# Patient Record
Sex: FEMALE | Race: BLACK OR AFRICAN AMERICAN
[De-identification: names, ages, dates, MRNs, and addresses within clinical notes are randomized per-mention and may not be internally consistent; named-entity substitution may affect disease eponyms.]

---

## 2019-08-24 ENCOUNTER — HOSPITAL ENCOUNTER (EMERGENCY)
Dept: HOSPITAL 62 - ER | Age: 39
Discharge: HOME | End: 2019-08-24
Payer: MEDICARE

## 2019-08-24 VITALS — DIASTOLIC BLOOD PRESSURE: 94 MMHG | SYSTOLIC BLOOD PRESSURE: 150 MMHG

## 2019-08-24 DIAGNOSIS — R07.9: Primary | ICD-10-CM

## 2019-08-24 DIAGNOSIS — R06.02: ICD-10-CM

## 2019-08-24 LAB
ADD MANUAL DIFF: NO
ALBUMIN SERPL-MCNC: 3.7 G/DL (ref 3.5–5)
ALP SERPL-CCNC: 111 U/L (ref 38–126)
ANION GAP SERPL CALC-SCNC: 5 MMOL/L (ref 5–19)
APPEARANCE UR: CLEAR
APTT PPP: YELLOW S
AST SERPL-CCNC: 39 U/L (ref 14–36)
BASOPHILS # BLD AUTO: 0 10^3/UL (ref 0–0.2)
BASOPHILS NFR BLD AUTO: 0.9 % (ref 0–2)
BILIRUB DIRECT SERPL-MCNC: 0.4 MG/DL (ref 0–0.4)
BILIRUB SERPL-MCNC: 0.5 MG/DL (ref 0.2–1.3)
BILIRUB UR QL STRIP: NEGATIVE
BUN SERPL-MCNC: 6 MG/DL (ref 7–20)
CALCIUM: 9.3 MG/DL (ref 8.4–10.2)
CHLORIDE SERPL-SCNC: 110 MMOL/L (ref 98–107)
CO2 SERPL-SCNC: 25 MMOL/L (ref 22–30)
EOSINOPHIL # BLD AUTO: 0 10^3/UL (ref 0–0.6)
EOSINOPHIL NFR BLD AUTO: 0.3 % (ref 0–6)
ERYTHROCYTE [DISTWIDTH] IN BLOOD BY AUTOMATED COUNT: 17.8 % (ref 11.5–14)
GLUCOSE SERPL-MCNC: 81 MG/DL (ref 75–110)
GLUCOSE UR STRIP-MCNC: NEGATIVE MG/DL
HCT VFR BLD CALC: 33 % (ref 36–47)
HGB BLD-MCNC: 10.5 G/DL (ref 12–15.5)
KETONES UR STRIP-MCNC: NEGATIVE MG/DL
LYMPHOCYTES # BLD AUTO: 1.4 10^3/UL (ref 0.5–4.7)
LYMPHOCYTES NFR BLD AUTO: 30.6 % (ref 13–45)
MCH RBC QN AUTO: 23.5 PG (ref 27–33.4)
MCHC RBC AUTO-ENTMCNC: 31.8 G/DL (ref 32–36)
MCV RBC AUTO: 74 FL (ref 80–97)
MONOCYTES # BLD AUTO: 0.4 10^3/UL (ref 0.1–1.4)
MONOCYTES NFR BLD AUTO: 9.4 % (ref 3–13)
NEUTROPHILS # BLD AUTO: 2.7 10^3/UL (ref 1.7–8.2)
NEUTS SEG NFR BLD AUTO: 58.8 % (ref 42–78)
NITRITE UR QL STRIP: NEGATIVE
PH UR STRIP: 6 [PH] (ref 5–9)
PLATELET # BLD: 363 10^3/UL (ref 150–450)
POTASSIUM SERPL-SCNC: 4.5 MMOL/L (ref 3.6–5)
PROT SERPL-MCNC: 8.1 G/DL (ref 6.3–8.2)
PROT UR STRIP-MCNC: 100 MG/DL
RBC # BLD AUTO: 4.47 10^6/UL (ref 3.72–5.28)
SP GR UR STRIP: 1.01
TOTAL CELLS COUNTED % (AUTO): 100 %
UROBILINOGEN UR-MCNC: NEGATIVE MG/DL (ref ?–2)
WBC # BLD AUTO: 4.6 10^3/UL (ref 4–10.5)

## 2019-08-24 PROCEDURE — 93005 ELECTROCARDIOGRAM TRACING: CPT

## 2019-08-24 PROCEDURE — 99285 EMERGENCY DEPT VISIT HI MDM: CPT

## 2019-08-24 PROCEDURE — 84484 ASSAY OF TROPONIN QUANT: CPT

## 2019-08-24 PROCEDURE — 36415 COLL VENOUS BLD VENIPUNCTURE: CPT

## 2019-08-24 PROCEDURE — 81001 URINALYSIS AUTO W/SCOPE: CPT

## 2019-08-24 PROCEDURE — 71046 X-RAY EXAM CHEST 2 VIEWS: CPT

## 2019-08-24 PROCEDURE — 93010 ELECTROCARDIOGRAM REPORT: CPT

## 2019-08-24 PROCEDURE — 85025 COMPLETE CBC W/AUTO DIFF WBC: CPT

## 2019-08-24 PROCEDURE — 71275 CT ANGIOGRAPHY CHEST: CPT

## 2019-08-24 PROCEDURE — 85379 FIBRIN DEGRADATION QUANT: CPT

## 2019-08-24 PROCEDURE — 80053 COMPREHEN METABOLIC PANEL: CPT

## 2019-08-24 PROCEDURE — 84703 CHORIONIC GONADOTROPIN ASSAY: CPT

## 2019-08-24 NOTE — ER DOCUMENT REPORT
ED General





- General


Chief Complaint: Chest Pain


Stated Complaint: CHEST PAIN


Time Seen by Provider: 08/24/19 15:22


Primary Care Provider: 


OMAR STARR MD [ACTIVE STAFF] - Follow up in 3-5 days (local primary care. )


Mode of Arrival: Ambulatory


Notes: 





Patient is a 39-year-old female that presents to the emergency department for 

chief complaint of chest pain.  The patient reports that the pain started 2 days

ago.  The currently rate the pain as 6 out of 10, and described as constant heav

iness in the middle of her chest.  They have had associated shortness of breath.

 Denies any nausea, vomiting, diaphoresis or worse with exertion.  Their risk 

factors for heart disease include family history, hypertension.  Patient states 

she is visiting from out of town in [place], she flew here several weeks ago, 

planning on going home by the end of the month, she did not have any prolonged 

car rides, denies any leg swelling or redness.  She denies prior history of DVT 

or PE, she states she does have lupus, which is currently controlled.





Past Medical History: Peripheral neuropathy, systemic lupus erythematous


Past Surgical History: Gastric bypass surgery, breast reduction, cholecystectomy


Social History: Denies tobacco, alcohol or drug use.


Family History: Reviewed and noncontributory for presenting illness


Allergies: Reviewed, see documented allergy list. 





REVIEW OF SYSTEMS:


Other than noted above, the 12 point review of systems was reviewed with the 

patient and were negative, all pertinent findings are included in the HPI.





PHYSICAL EXAMINATION:





Vital signs reviewed, nursing noted reviewed. 





GENERAL: Well-appearing, well-nourished and in no acute distress.





HEAD: Atraumatic, normocephalic.





EYES: Eyes appear normal, extraocular movements intact, sclera anicteric, 

conjunctiva are normal.





ENT: nares patent, oropharynx clear without exudates.  Moist mucous membranes.





NECK: Normal range of motion, supple without lymphadenopathy





LUNGS: Breath sounds clear to auscultation bilaterally and equal.  No wheezes 

rales or rhonchi.





HEART: Regular rate and rhythm without murmurs





ABDOMEN: Soft, nontender, normoactive bowel sounds.  No rebound, guarding, or 

rigidity. No masses appreciated.





EXTREMITIES: Nontender, good range of motion, no pitting or edema.  





NEUROLOGICAL: No focal neurological deficits. Moves all extremities 

spontaneously Motor and sensory grossly intact on exam.





PSYCH: Normal mood, normal affect.





SKIN: Warm, Dry, normal turgor, no rashes or lesions noted on exposed skin





TRAVEL OUTSIDE OF THE [place] IN LAST 30 DAYS: No





- Related Data


Allergies/Adverse Reactions: 


                                        





levofloxacin [From Levaquin] Allergy (Verified 08/24/19 14:57)


   


sulfamethoxazole [From Bactrim] Allergy (Verified 08/24/19 14:57)


   


trimethoprim [From Bactrim] Allergy (Verified 08/24/19 14:57)


   











Past Medical History





- General


Information source: Patient





- Social History


Smoking Status: Never Smoker


Family History: Reviewed & Not Pertinent


Patient has suicidal ideation: No


Patient has homicidal ideation: No


Renal/ Medical History: Denies: Hx Peritoneal Dialysis





Physical Exam





- Vital signs


Vitals: 


                                        











Temp Pulse Resp BP Pulse Ox


 


 98.8 F   80   16   151/63 H  100 


 


 08/24/19 15:09  08/24/19 15:09  08/24/19 15:09  08/24/19 15:09  08/24/19 15:09














Course





- Re-evaluation


Re-evalutation: 





Patient seen and examined vital signs reviewed. 





Laboratory data and/or imaging were ordered as appropriate for the patient's 

presenting symptoms and complaint, with consideration of any critical or life 

threatening conditions that may be associated with their obtained history and 

exam as noted above.





Patient was treated with GI cocktail, aspirin was ordered in triage





Results were reviewed when available and demonstrated negative troponin, d-dimer

was positive, therefore CT Liyah Cordova of the chest was ordered, this was 

negative for PE and otherwise unremarkable, blood work otherwise unremarkable as

well exception of a mild anemia





The patient was re-evaluated and was stable, states she felt improved after the 

GI cocktail, patient has been taken Motrin on and off for headaches as well as 

Aleve, which I advised her not to do given that she had a Britta-en-Y gastric 

bypass surgery, which could be causing her symptoms of chest discomfort now, I 

will prescribe her Meprazole 40 mg daily, advised avoiding NSAIDs and taking 

Tylenol for her headaches, and to follow-up with her primary care when she 

returns to [place].





Evaluation was most consistent with chest discomfort, mostly likely GI etiology





Results were discussed with the patient at this point, after careful 

consideration I feel that that patient can be discharged from the emergency 

department, the patient was educated treatments and reasons to return to the 

emergency department based on their presumed diagnosis as noted above, they were

advised to followup with a primary care physician in 2-3 days. Patient was 

agreeable to plan of care.





*Note is created using voice recognition software and may contain spelling, 

syntax or grammatical errors.








Laboratory











  08/24/19 08/24/19 08/24/19





  16:00 16:00 16:00


 


WBC  4.6  


 


RBC  4.47  


 


Hgb  10.5 L  


 


Hct  33.0 L  


 


MCV  74 L  


 


MCH  23.5 L  


 


MCHC  31.8 L  


 


RDW  17.8 H  


 


Plt Count  363  


 


Lymph % (Auto)  30.6  


 


Mono % (Auto)  9.4  


 


Eos % (Auto)  0.3  


 


Baso % (Auto)  0.9  


 


Absolute Neuts (auto)  2.7  


 


Absolute Lymphs (auto)  1.4  


 


Absolute Monos (auto)  0.4  


 


Absolute Eos (auto)  0.0  


 


Absolute Basos (auto)  0.0  


 


Seg Neutrophils %  58.8  


 


D-Dimer   


 


Sodium   140.2 


 


Potassium   4.5 


 


Chloride   110 H 


 


Carbon Dioxide   25 


 


Anion Gap   5 


 


BUN   6 L 


 


Creatinine   0.44 L 


 


Est GFR ( Amer)   > 60 


 


Est GFR (MDRD) Non-Af   > 60 


 


Glucose   81 


 


Calcium   9.3 


 


Total Bilirubin   0.5 


 


Direct Bilirubin   0.4 


 


Neonat Total Bilirubin   Not Reportable 


 


Neonat Direct Bilirubin   Not Reportable 


 


Neonat Indirect Bili   Not Reportable 


 


AST   39 H 


 


ALT   17 


 


Alkaline Phosphatase   111 


 


Troponin I   


 


Total Protein   8.1 


 


Albumin   3.7 


 


Serum HCG, Qual    NEGATIVE


 


Urine Color   


 


Urine Appearance   


 


Urine pH   


 


Ur Specific Gravity   


 


Urine Protein   


 


Urine Glucose (UA)   


 


Urine Ketones   


 


Urine Blood   


 


Urine Nitrite   


 


Urine Bilirubin   


 


Urine Urobilinogen   


 


Ur Leukocyte Esterase   


 


Urine WBC (Auto)   


 


Urine RBC (Auto)   


 


Squamous Epi Cells Auto   


 


Urine Mucus (Auto)   


 


Urine Ascorbic Acid   














  08/24/19 08/24/19 08/24/19





  16:00 16:00 16:00


 


WBC   


 


RBC   


 


Hgb   


 


Hct   


 


MCV   


 


MCH   


 


MCHC   


 


RDW   


 


Plt Count   


 


Lymph % (Auto)   


 


Mono % (Auto)   


 


Eos % (Auto)   


 


Baso % (Auto)   


 


Absolute Neuts (auto)   


 


Absolute Lymphs (auto)   


 


Absolute Monos (auto)   


 


Absolute Eos (auto)   


 


Absolute Basos (auto)   


 


Seg Neutrophils %   


 


D-Dimer   1.91 H 


 


Sodium   


 


Potassium   


 


Chloride   


 


Carbon Dioxide   


 


Anion Gap   


 


BUN   


 


Creatinine   


 


Est GFR ( Amer)   


 


Est GFR (MDRD) Non-Af   


 


Glucose   


 


Calcium   


 


Total Bilirubin   


 


Direct Bilirubin   


 


Neonat Total Bilirubin   


 


Neonat Direct Bilirubin   


 


Neonat Indirect Bili   


 


AST   


 


ALT   


 


Alkaline Phosphatase   


 


Troponin I  < 0.012  


 


Total Protein   


 


Albumin   


 


Serum HCG, Qual   


 


Urine Color    YELLOW


 


Urine Appearance    CLEAR


 


Urine pH    6.0


 


Ur Specific Gravity    1.012


 


Urine Protein    100 H


 


Urine Glucose (UA)    NEGATIVE


 


Urine Ketones    NEGATIVE


 


Urine Blood    SMALL H


 


Urine Nitrite    NEGATIVE


 


Urine Bilirubin    NEGATIVE


 


Urine Urobilinogen    NEGATIVE


 


Ur Leukocyte Esterase    NEGATIVE


 


Urine WBC (Auto)    1


 


Urine RBC (Auto)    0


 


Squamous Epi Cells Auto    1


 


Urine Mucus (Auto)    OCC


 


Urine Ascorbic Acid    NEGATIVE











                                        





Chest X-Ray  08/24/19 15:27


IMPRESSION:  NO ACUTE DISEASE.


 








Chest/Abdomen CTA  08/24/19 16:48


IMPRESSION:  NORMAL CTA OF THE CHEST. NO PULMONARY EMBOLI.


 














- Vital Signs


Vital signs: 


                                        











Temp Pulse Resp BP Pulse Ox


 


 99.1 F   73   18   150/94 H  100 


 


 08/24/19 19:51  08/24/19 19:51  08/24/19 19:51  08/24/19 19:51  08/24/19 19:51














- Laboratory


Result Diagrams: 


                                 08/24/19 16:00





                                 08/24/19 16:00


Laboratory results interpreted by me: 


                                        











  08/24/19 08/24/19 08/24/19





  16:00 16:00 16:00


 


Hgb  10.5 L  


 


Hct  33.0 L  


 


MCV  74 L  


 


MCH  23.5 L  


 


MCHC  31.8 L  


 


RDW  17.8 H  


 


D-Dimer    1.91 H


 


Chloride   110 H 


 


BUN   6 L 


 


Creatinine   0.44 L 


 


AST   39 H 


 


Urine Protein   


 


Urine Blood   














  08/24/19





  16:00


 


Hgb 


 


Hct 


 


MCV 


 


MCH 


 


MCHC 


 


RDW 


 


D-Dimer 


 


Chloride 


 


BUN 


 


Creatinine 


 


AST 


 


Urine Protein  100 H


 


Urine Blood  SMALL H














- EKG Interpretation by Me


Additional EKG results interpreted by me: 





EKG demonstrates sinus rhythm with a ventricular rate of 60 bpm, normal axis, 

 ms, no evidence of acute ischemia in this EKG, compared to prior EKG 

from 8/23/2016, without significant change.





Discharge





- Discharge


Clinical Impression: 


Chest pain


Qualifiers:


 Chest pain type: unspecified Qualified Code(s): R07.9 - Chest pain, unspecified





Condition: Stable


Disposition: HOME, SELF-CARE


Instructions:  Chest Pain of Unclear Cause ([facility])


Additional Instructions: 


Please start taking the prescribed omeprazole, 40 mg once daily for the next 2 

weeks, you have been given a month's worth of it though if needed, avoid any 

anti-inflammatory medicine such as Aleve, ibuprofen, Motrin, or Advil.





Please follow-up with your primary care physician when you return to [place].  If 

you have any worsening of your symptoms, or things are not improving, do not 

hesitate to return to the emergency department.


Prescriptions: 


Omeprazole 40 mg PO DAILY #30 capsule.dr


Referrals: 


OMAR STARR MD [ACTIVE STAFF] - Follow up in 3-5 days (local primary care. )

## 2019-08-24 NOTE — RADIOLOGY REPORT (SQ)
EXAM DESCRIPTION:  CTA CHEST



COMPLETED DATE/TIME:  8/24/2019 7:06 pm



REASON FOR STUDY:  chest pain, elevated d dimer



COMPARISON:  None.



TECHNIQUE:  CT scan of the chest performed using helical scanning technique with dynamic intravenous 
contrast injection.  Images reviewed with lung, soft tissue and bone windows.  Reconstructed coronal 
and sagittal MPR images reviewed.

Additional 3 dimensional post-processing performed to develop Maximal Intensity Projection images (MI
P).  All images stored on PACS.

All CT scanners at this facility use dose modulation, iterative reconstruction, and/or weight based d
osing when appropriate to reduce radiation dose to as low as reasonably achievable (ALARA).

[facility]: Dose Right  [facility]: CareDose    [facility]: Dose Right    [facility]: Teradose 4D    [facility]: [employer] Technologies



CONTRAST TYPE AND DOSE:  contrast/concentration: Isovue 350.00 mg/ml; Total Contrast Delivered: 65.0 
ml; Total Saline Delivered: 77.0 ml

Contrast bolus optimized for the pulmonary arteries. Not diagnostic for the aorta.



RENAL FUNCTION:  Creatinine:  0.44



RADIATION DOSE:  CT Rad equipment meets quality standard of care and radiation dose reduction techniq
ues were employed. CTDIvol: 3.8 - 15.6 mGy. DLP: 606 mGy-cm. .



LIMITATIONS:  None.



FINDINGS:  LUNGS AND PLEURA: No masses, infiltrates, or pneumothorax.  No pleural effusions or pleura
l calcifications.

AORTA AND GREAT VESSELS: Changes of a bovine arch.   Contrast bolus not optimized for the aorta.

HEART: No pericardial effusion. No significant coronary artery calcifications.

PULMONARY ARTERIES: No emboli visualized in the main pulmonary arteries or the segmental branches.

HILAR AND MEDIASTINAL STRUCTURES: No identified masses or abnormal nodes.

HARDWARE: None in the chest.

UPPER ABDOMEN: Status post gastric bypass.  Status post cholecystectomy.

THYROID AND OTHER SOFT TISSUES: Normal.

BONES: No acute or significant finding.

3D MIPS: Confirm above findings.



IMPRESSION:  NORMAL CTA OF THE CHEST. NO PULMONARY EMBOLI.



COMMENT:  Quality ID # [id number]: Final reports with documentation of one or more dose reduction techniques
 (e.g., Automated exposure control, adjustment of the mA and/or kV according to patient size, use of 
iterative reconstruction technique)



TECHNICAL DOCUMENTATION:  JOB ID:  [id number]

[id number]

 2011 [employer]- All Rights Reserved



Reading location - IP/workstation name: [email or web address]

## 2019-08-24 NOTE — ER DOCUMENT REPORT
ED Medical Screen (RME)





- General


Chief Complaint: Chest Pain


Stated Complaint: CHEST PAIN


Time Seen by Provider: 08/24/19 15:22


Mode of Arrival: Ambulatory


Information source: Patient


Notes: 





39-year-old female presented to ED for complaint of chest pain heaviness to the 

center of her chest x2 days.  She states she has been short of breath.  She 

states she did not come to the emergency room because here visiting her sister 

and her sister was out of state so she was watching her niece and her mother and

her mother has dementia and she could not leave them at home by themselves.  She

states she does have a history of a stroke stroke in 2006 and 2010 both of these

were because she was obese at over 400 pounds.  He states she also has a lupus 

enlarged heart high blood pressure and neuropathy.  She states she has had a 

gastric bypass and a breast reduction as well as her gallbladder removed.  She 

denies smoking drinking or doing any drugs.  She is alert oriented respirations 

regular and unlabored speaking in full sentences and walks with even steady 

gait.

















I have greeted and performed a rapid initial assessment of this patient.  A 

comprehensive ED assessment and evaluation of the patient, analysis of test 

results and completion of medical decision making process will be conducted by 

an additional ED providers.


TRAVEL OUTSIDE OF THE [place] IN LAST 30 DAYS: No





- Related Data


Allergies/Adverse Reactions: 


                                        





levofloxacin [From Levaquin] Allergy (Verified 08/24/19 14:57)


   


sulfamethoxazole [From Bactrim] Allergy (Verified 08/24/19 14:57)


   


trimethoprim [From Bactrim] Allergy (Verified 08/24/19 14:57)


   











Physical Exam





- Vital signs


Vitals: 





                                        











Temp Pulse Resp BP Pulse Ox


 


 98.8 F   80   16   151/63 H  100 


 


 08/24/19 15:09  08/24/19 15:09  08/24/19 15:09  08/24/19 15:09  08/24/19 15:09














Course





- Vital Signs


Vital signs: 





                                        











Temp Pulse Resp BP Pulse Ox


 


 98.8 F   80   16   151/63 H  100 


 


 08/24/19 15:09  08/24/19 15:09  08/24/19 15:09  08/24/19 15:09  08/24/19 15:09

## 2019-08-24 NOTE — EKG REPORT
SEVERITY:- ABNORMAL ECG -

SINUS RHYTHM

NONSPECIFIC T ABNORMALITIES, INFERIOR LEADS

:

Confirmed by: Italia Swann MD 24-Aug-2019 19:10:02

## 2019-08-24 NOTE — RADIOLOGY REPORT (SQ)
EXAM DESCRIPTION:  CHEST 2 VIEWS



COMPLETED DATE/TIME:  8/24/2019 4:16 pm



REASON FOR STUDY:  chest pain



COMPARISON:  None.



EXAM PARAMETERS:  NUMBER OF VIEWS: two views

TECHNIQUE: Digital Frontal and Lateral radiographic views of the chest acquired.

RADIATION DOSE: NA

LIMITATIONS: none



FINDINGS:  LUNGS AND PLEURA: No acute infiltrates or effusions.

MEDIASTINUM AND HILAR STRUCTURES: No masses or contour abnormalities.

HEART AND VASCULAR STRUCTURES: Normal heart and pulmonary vasculature.

BONES: There is a density overlying 1st left rib felt represent between the left anterior 1st and pos
terior left 5th rib combined with pulmonary vascular markings.

HARDWARE: None in the chest.

OTHER: No other significant finding.



IMPRESSION:  NO ACUTE DISEASE.



TECHNICAL DOCUMENTATION:  JOB ID:  [id number]

[id number]

 2011 [employer]- All Rights Reserved



Reading location - IP/workstation name: [email or web address]